# Patient Record
Sex: FEMALE | URBAN - METROPOLITAN AREA
[De-identification: names, ages, dates, MRNs, and addresses within clinical notes are randomized per-mention and may not be internally consistent; named-entity substitution may affect disease eponyms.]

---

## 2023-12-04 ENCOUNTER — OFFICE VISIT (OUTPATIENT)
Dept: URBAN - METROPOLITAN AREA CLINIC 92 | Facility: CLINIC | Age: 28
End: 2023-12-04
Payer: OTHER GOVERNMENT

## 2023-12-04 VITALS
TEMPERATURE: 97.2 F | DIASTOLIC BLOOD PRESSURE: 63 MMHG | BODY MASS INDEX: 20.36 KG/M2 | HEIGHT: 65 IN | WEIGHT: 122.2 LBS | SYSTOLIC BLOOD PRESSURE: 104 MMHG | HEART RATE: 67 BPM

## 2023-12-04 DIAGNOSIS — K62.5 RECTAL BLEEDING: ICD-10-CM

## 2023-12-04 PROCEDURE — 99204 OFFICE O/P NEW MOD 45 MIN: CPT | Performed by: INTERNAL MEDICINE

## 2023-12-04 RX ORDER — SODIUM, POTASSIUM,MAG SULFATES 17.5-3.13G
17.5-13.3-1.6 GM/177ML SOLUTION, RECONSTITUTED, ORAL ORAL
Qty: 354 MILLILITER | Refills: 0 | OUTPATIENT
Start: 2023-12-04 | End: 2023-12-05

## 2023-12-04 NOTE — HPI-TODAY'S VISIT:
This is a 28-year-old female who now presents for evaluation of rectal bleeding.  Since March, patient has had intermittent rectal bleeding with BRBPR.   She also reports having rectal bleeding with urination.  She has occasional constipation with hard stools that require straining.  There is  Intermittent lower quadrant abdominal pain without any exacerbating or alleviating factors.  Patient denies any N/V, hematemesis, or melena.  There is no fever, chills, or constitutional symptoms including unintentional weight loss.  She denies any family history of colorectal malignancy.

## 2023-12-07 ENCOUNTER — OUT OF OFFICE VISIT (OUTPATIENT)
Dept: URBAN - METROPOLITAN AREA SURGERY CENTER 16 | Facility: SURGERY CENTER | Age: 28
End: 2023-12-07
Payer: OTHER GOVERNMENT

## 2023-12-07 DIAGNOSIS — K62.5 HEMORRHAGE OF ANUS AND RECTUM: ICD-10-CM

## 2023-12-07 DIAGNOSIS — K64.8 EXTERNAL HEMORRHOIDS: ICD-10-CM

## 2023-12-07 DIAGNOSIS — K62.5 ANAL BLEEDING: ICD-10-CM

## 2023-12-07 PROCEDURE — 00811 ANES LWR INTST NDSC NOS: CPT | Performed by: ANESTHESIOLOGIST ASSISTANT

## 2023-12-07 PROCEDURE — 00811 ANES LWR INTST NDSC NOS: CPT | Performed by: ANESTHESIOLOGY

## 2023-12-07 PROCEDURE — 45378 DIAGNOSTIC COLONOSCOPY: CPT | Performed by: INTERNAL MEDICINE

## 2023-12-07 PROCEDURE — G8907 PT DOC NO EVENTS ON DISCHARG: HCPCS | Performed by: INTERNAL MEDICINE

## 2023-12-18 ENCOUNTER — OFFICE VISIT (OUTPATIENT)
Dept: URBAN - METROPOLITAN AREA CLINIC 92 | Facility: CLINIC | Age: 28
End: 2023-12-18

## 2023-12-18 ENCOUNTER — TELEPHONE ENCOUNTER (OUTPATIENT)
Dept: URBAN - METROPOLITAN AREA CLINIC 92 | Facility: CLINIC | Age: 28
End: 2023-12-18

## 2023-12-18 RX ORDER — TRETINOIN 0.5 MG/G
APPLY 1 APPLICATION TO FACE ONCE A DAY AT BEDTIME FOR 30 DAYS CREAM TOPICAL
Qty: 45 GRAM | Refills: 0 | COMMUNITY

## 2023-12-18 RX ORDER — PHENAZOPYRIDINE HYDROCHLORIDE 200 MG/1
TAKE 1 TABLET BY MOUTH 3 TIMES A DAY AS NEEDED FOR PAIN FOR UP TO 2 DAYS TABLET ORAL
Qty: 6 EACH | Refills: 0 | COMMUNITY

## 2023-12-18 RX ORDER — HYDROCORTISONE ACETATE PRAMOXINE HCL 2.5; 1 G/100G; G/100G
APPLY TO AFFECTED AREA 3 TIMES A DAY CREAM TOPICAL
Qty: 30 GRAM | Refills: 0 | COMMUNITY

## 2023-12-18 NOTE — HPI-TODAY'S VISIT:
This is a 28-year-old female who now presents for evaluation of rectal bleeding.  Since March, patient has had intermittent rectal bleeding with BRBPR.   She also reports having rectal bleeding with urination.  She has occasional constipation with hard stools that require straining.  There is  Intermittent lower quadrant abdominal pain without any exacerbating or alleviating factors.  Patient denies any N/V, hematemesis, or melena.  There is no fever, chills, or constitutional symptoms including unintentional weight loss.  She denies any family history of colorectal malignancy.  Colonoscopy on 12/7/23 with  revealed a normal colon, non-bleeding IH, no specimens collected

## 2023-12-26 ENCOUNTER — OFFICE VISIT (OUTPATIENT)
Dept: URBAN - METROPOLITAN AREA CLINIC 92 | Facility: CLINIC | Age: 28
End: 2023-12-26
Payer: OTHER GOVERNMENT

## 2023-12-26 ENCOUNTER — DASHBOARD ENCOUNTERS (OUTPATIENT)
Age: 28
End: 2023-12-26

## 2023-12-26 VITALS — HEIGHT: 65 IN | WEIGHT: 122.2 LBS | BODY MASS INDEX: 20.36 KG/M2

## 2023-12-26 DIAGNOSIS — K62.5 RECTAL BLEEDING: ICD-10-CM

## 2023-12-26 PROCEDURE — 99213 OFFICE O/P EST LOW 20 MIN: CPT

## 2023-12-26 RX ORDER — TRETINOIN 0.5 MG/G
APPLY 1 APPLICATION TO FACE ONCE A DAY AT BEDTIME FOR 30 DAYS CREAM TOPICAL
Qty: 45 GRAM | Refills: 0 | Status: ON HOLD | COMMUNITY

## 2023-12-26 RX ORDER — HYDROCORTISONE ACETATE PRAMOXINE HCL 2.5; 1 G/100G; G/100G
APPLY TO AFFECTED AREA 3 TIMES A DAY CREAM TOPICAL
Qty: 30 GRAM | Refills: 0 | Status: ON HOLD | COMMUNITY

## 2023-12-26 RX ORDER — PHENAZOPYRIDINE HYDROCHLORIDE 200 MG/1
TAKE 1 TABLET BY MOUTH 3 TIMES A DAY AS NEEDED FOR PAIN FOR UP TO 2 DAYS TABLET ORAL
Qty: 6 EACH | Refills: 0 | Status: ON HOLD | COMMUNITY

## 2023-12-26 NOTE — PHYSICAL EXAM HENT:
Head, normocephalic, atraumatic, Face, Face within normal limits,  Nose/Nasopharynx, External nose normal appearance, nares patent, no nasal discharge, Lips, Appearance normal

## 2023-12-26 NOTE — HPI-TODAY'S VISIT:
This is a 28-year-old female who now presents in follow up from her colonoscopy.  Since March, patient has had intermittent rectal bleeding with BRBPR. She also reports having rectal bleeding with urination.  She has occasional constipation with hard stools that require straining.  There is Intermittent lower quadrant abdominal pain without any exacerbating or alleviating factors.  Patient denies any N/V, hematemesis, or melena.  There is no fever, chills, or constitutional symptoms including unintentional weight loss.  She denies any family history of colorectal malignancy.  Colonoscopy on 12/7/23 with Dr. Wayne revealed a normal colon, non-bleeding IH, no specimens collected  Today via telehealth, patient notes she transitioned to a high fiber diet and is avoiding breads and pastas. Now having a daily BM without straining. She had two episodes of BRBPR since her colonoscopy. She continues to use Preparation H QD in the mornings. Reports her LLQ abdominal pain has resolved.